# Patient Record
Sex: FEMALE | Race: WHITE | Employment: OTHER | URBAN - METROPOLITAN AREA
[De-identification: names, ages, dates, MRNs, and addresses within clinical notes are randomized per-mention and may not be internally consistent; named-entity substitution may affect disease eponyms.]

---

## 2022-04-04 ENCOUNTER — TRANSFERRED RECORDS (OUTPATIENT)
Dept: HEALTH INFORMATION MANAGEMENT | Facility: CLINIC | Age: 67
End: 2022-04-04
Payer: COMMERCIAL

## 2022-04-07 ENCOUNTER — TRANSFERRED RECORDS (OUTPATIENT)
Dept: HEALTH INFORMATION MANAGEMENT | Facility: CLINIC | Age: 67
End: 2022-04-07
Payer: COMMERCIAL

## 2022-04-07 ENCOUNTER — MEDICAL CORRESPONDENCE (OUTPATIENT)
Dept: HEALTH INFORMATION MANAGEMENT | Facility: CLINIC | Age: 67
End: 2022-04-07
Payer: COMMERCIAL

## 2022-04-11 ENCOUNTER — TRANSFERRED RECORDS (OUTPATIENT)
Dept: HEALTH INFORMATION MANAGEMENT | Facility: CLINIC | Age: 67
End: 2022-04-11
Payer: COMMERCIAL

## 2022-04-20 ENCOUNTER — TRANSCRIBE ORDERS (OUTPATIENT)
Dept: OTHER | Age: 67
End: 2022-04-20
Payer: COMMERCIAL

## 2022-04-20 DIAGNOSIS — R22.41 ANKLE MASS, RIGHT: Primary | ICD-10-CM

## 2022-04-22 NOTE — TELEPHONE ENCOUNTER
Action    Action Taken 4/22/2022 10:37AM KEB   [facility] Phone: [phone] #0- unavailable.     5/2/2022 8:42AM KEB   I called the [facility] Ph: [phone] #0- I left a detailed vm for medical records.      I called [facility] in [place] Ph: [phone] -     5/4/2022 3:56pm KEB   I called pt Maranda - unavailable.     I called [facility] in [place] - they don't have an xray on the pt.      DIAGNOSIS: R Ankle Mass   APPOINTMENT DATE: 5/5/2022   NOTES STATUS DETAILS   OFFICE NOTE from referring provider Received ref. by Dr. Epi Holland MD at the [facility] in [place], [place]   OFFICE NOTE from other specialist Received [facility] Records are in EPIC   LABS     MRI Received [facility] @ [facility] in [place]   TUMOR     PATHOLOGY  Slides & report

## 2022-04-28 ASSESSMENT — ENCOUNTER SYMPTOMS
ARTHRALGIAS: 1
MUSCLE CRAMPS: 0
BACK PAIN: 0
JOINT SWELLING: 1
MYALGIAS: 0
STIFFNESS: 0
MUSCLE WEAKNESS: 0
NECK PAIN: 0

## 2022-05-02 ENCOUNTER — TELEPHONE (OUTPATIENT)
Dept: ORTHOPEDICS | Facility: CLINIC | Age: 67
End: 2022-05-02
Payer: COMMERCIAL

## 2022-05-02 NOTE — TELEPHONE ENCOUNTER
ATC called and spoke to pt. Pt wanted to know if Dr El sees sarcoma patients. Pt had other questions regarding how quickly she can get surgery/treatment, ATC stated it's depending on the severity of the case. ATC answered all questions. Pt appreciated call and ATC for answering her questions.     Sarah Downing ATC

## 2022-05-02 NOTE — TELEPHONE ENCOUNTER
[facility] Call Center    Phone Message    May a detailed message be left on voicemail: yes     Reason for Call: Other: Patient would like a call back to discuss the MRI and additional information before driving [place] out of [place].     Action Taken: Message routed to:  [facility] Orthopedics    Travel Screening: Not Applicable

## 2022-05-04 DIAGNOSIS — R22.40 ANKLE MASS: Primary | ICD-10-CM

## 2022-05-05 ENCOUNTER — PRE VISIT (OUTPATIENT)
Dept: ORTHOPEDICS | Facility: CLINIC | Age: 67
End: 2022-05-05

## 2022-05-05 ENCOUNTER — OFFICE VISIT (OUTPATIENT)
Dept: ORTHOPEDICS | Facility: CLINIC | Age: 67
End: 2022-05-05
Payer: COMMERCIAL

## 2022-05-05 ENCOUNTER — ANCILLARY PROCEDURE (OUTPATIENT)
Dept: GENERAL RADIOLOGY | Facility: CLINIC | Age: 67
End: 2022-05-05
Attending: ORTHOPAEDIC SURGERY
Payer: COMMERCIAL

## 2022-05-05 VITALS — BODY MASS INDEX: 30.82 KG/M2 | HEIGHT: 65 IN | WEIGHT: 185 LBS

## 2022-05-05 DIAGNOSIS — R22.41 ANKLE MASS, RIGHT: ICD-10-CM

## 2022-05-05 DIAGNOSIS — R22.40 ANKLE MASS: ICD-10-CM

## 2022-05-05 PROCEDURE — 73610 X-RAY EXAM OF ANKLE: CPT | Mod: RT | Performed by: RADIOLOGY

## 2022-05-05 PROCEDURE — 99204 OFFICE O/P NEW MOD 45 MIN: CPT | Performed by: ORTHOPAEDIC SURGERY

## 2022-05-05 RX ORDER — UBIDECARENONE 75 MG
500 CAPSULE ORAL
COMMUNITY

## 2022-05-05 RX ORDER — TRAZODONE HYDROCHLORIDE 50 MG/1
TABLET, FILM COATED ORAL
COMMUNITY
Start: 2020-04-28

## 2022-05-05 RX ORDER — FAMOTIDINE 20 MG/1
TABLET, FILM COATED ORAL
COMMUNITY
End: 2023-03-16

## 2022-05-05 NOTE — PROGRESS NOTES
"    [facility], Orthopaedic Oncology Surgery Consultation  by Gregory El M.D.    Maranda Arnett MRN# [id number]    YOB: 1955     Requesting physician: Epi Glover   No primary care provider on file.            Assessment and Plan:   Assessment:  Soft tissue mass 3 x 5.5 cm, superficial alongside dorsum of right tibiotalar joint.  Differential diagnosis would include benign eg PVNS, vs malignant soft tissue neoplasms.     Plan:  Proceed with core needle biopsy.  Unfortunate this could not be completed today and therefore will be planned for next clinic visit on Monday, May 9.           History of Present Illness:   67 year old female  chief complaint    This patient is referred by Dr. Holland in [place] for evaluation of a mass of her right ankle which has been present for approximately 3 to 4 years.  History is notable for a uterine malignancy 10 years ago.    Current symptoms:  Problem: Right Ankle Mass   Onset and duration: 4 years ago  Awakens from sleep due to sx's:  No  Precipitating Injury:  No    Other joints or sites painful:  No  Fever: No  Appetite change or weight loss: No  History of prior or existing cancer: Yes early stage uterine cancer     Background history:  DX:  1. Soft tissue mass right ankle    TREATMENTS:  1. Removal of uterine cancerous tumor at [facility] approx 10 years ago             Physical Exam:     EXAMINATION pertinent findings:   PSYCH: Pleasant, healthy-appearing, alert, oriented x3, cooperative. Normal mood and affect.  VITAL SIGNS: Height 1.651 m (5' 5\"), weight 83.9 kg (185 lb)..  Reviewed nursing intake notes.   Body mass index is 30.79 kg/m .  RESP: non labored breathing   ABD: benign, soft, non-tender, no acute peritoneal findings  SKIN: grossly normal   LYMPHATIC: grossly normal, no adenopathy, no extremity edema  NEURO: grossly normal , no motor deficits  VASCULAR: satisfactory perfusion of all extremities   MUSCULOSKELETAL:   Gait is " normal.  Palpable soft tissue mass of the dorsum of the right ankle.  Somewhat mobile.  3 cm in size.  Minimally tender.  Normal tibiotalar and subtalar and midfoot motion.             Data:   All laboratory data reviewed  All imaging studies reviewed by me          DATA for DOCUMENTATION:         Past Medical History:   There is no problem list on file for this patient.    No past medical history on file.    Also see scanned health assessment forms.       Past Surgical History:   No past surgical history on file.         Social History:     Social History     Socioeconomic History     Marital status:      Spouse name: Not on file     Number of children: Not on file     Years of education: Not on file     Highest education level: Not on file   Occupational History     Not on file   Tobacco Use     Smoking status: Not on file     Smokeless tobacco: Not on file   Substance and Sexual Activity     Alcohol use: Not on file     Drug use: Not on file     Sexual activity: Not on file   Other Topics Concern     Not on file   Social History Narrative     Not on file     Social Determinants of Health     Financial Resource Strain: Not on file   Food Insecurity: Not on file   Transportation Needs: Not on file   Physical Activity: Not on file   Stress: Not on file   Social Connections: Not on file   Intimate Partner Violence: Not on file   Housing Stability: Not on file            Family History:     No family history on file.         Medications:     Current Outpatient Medications   Medication Sig     Cholecalciferol (VITAMIN D3) 1.25 MG (83793 UT) TABS Vitamin D3     cyanocobalamin (VITAMIN B-12) 100 MCG tablet Vitamin B12   daily     famotidine (PEPCID) 20 MG tablet famotidine 20 mg tablet     traZODone (DESYREL) 50 MG tablet trazodone 50 mg tablet   TAKE 1 TABLET BY MOUTH EVERY DAY     No current facility-administered medications for this visit.              Review of Systems:   A comprehensive 10 point review of  systems (constitutional, ENT, cardiac, peripheral vascular, lymphatic, respiratory, GI, , Musculoskeletal, skin, Neurological) was performed and found to be negative except as described in this note.     See intake form completed by patient    Answers for HPI/ROS submitted by the patient on 4/28/2022  General Symptoms: No  Skin Symptoms: No  HENT Symptoms: No  EYE SYMPTOMS: No  HEART SYMPTOMS: No  LUNG SYMPTOMS: No  INTESTINAL SYMPTOMS: No  URINARY SYMPTOMS: No  GYNECOLOGIC SYMPTOMS: No  BREAST SYMPTOMS: No  SKELETAL SYMPTOMS: Yes  BLOOD SYMPTOMS: No  NERVOUS SYSTEM SYMPTOMS: No  MENTAL HEALTH SYMPTOMS: No  Back pain: No  Muscle aches: No  Neck pain: No  Swollen joints: Yes  Joint pain: Yes  Bone pain: Yes  Muscle cramps: No  Muscle weakness: No  Joint stiffness: No  Bone fracture: No

## 2022-05-05 NOTE — LETTER
Date:May 6, 2022      Provider requested that no letter be sent. Do not send.       [facility]

## 2022-05-09 ENCOUNTER — OFFICE VISIT (OUTPATIENT)
Dept: ORTHOPEDICS | Facility: CLINIC | Age: 67
End: 2022-05-09
Payer: COMMERCIAL

## 2022-05-09 VITALS — HEIGHT: 65 IN | WEIGHT: 185 LBS | BODY MASS INDEX: 30.82 KG/M2

## 2022-05-09 DIAGNOSIS — R22.41 ANKLE MASS, RIGHT: Primary | ICD-10-CM

## 2022-05-09 PROCEDURE — 88305 TISSUE EXAM BY PATHOLOGIST: CPT | Mod: 26 | Performed by: PATHOLOGY

## 2022-05-09 PROCEDURE — 99213 OFFICE O/P EST LOW 20 MIN: CPT | Mod: 25 | Performed by: ORTHOPAEDIC SURGERY

## 2022-05-09 PROCEDURE — 88305 TISSUE EXAM BY PATHOLOGIST: CPT | Mod: TC | Performed by: ORTHOPAEDIC SURGERY

## 2022-05-09 PROCEDURE — 20206 BIOPSY MUSCLE PERQ NEEDLE: CPT | Mod: RT | Performed by: ORTHOPAEDIC SURGERY

## 2022-05-09 PROCEDURE — 88333 PATH CONSLTJ SURG CYTO XM 1: CPT | Mod: TC | Performed by: ORTHOPAEDIC SURGERY

## 2022-05-09 RX ORDER — MELOXICAM 7.5 MG/1
TABLET ORAL
Status: ON HOLD | COMMUNITY
End: 2022-06-03

## 2022-05-09 RX ORDER — ZOLPIDEM TARTRATE 5 MG/1
TABLET ORAL
Status: ON HOLD | COMMUNITY
End: 2022-06-03

## 2022-05-09 RX ORDER — IBUPROFEN 800 MG/1
TABLET, FILM COATED ORAL
COMMUNITY
End: 2023-03-16

## 2022-05-09 RX ORDER — AZITHROMYCIN 250 MG/1
TABLET, FILM COATED ORAL
Status: ON HOLD | COMMUNITY
End: 2022-06-03

## 2022-05-09 RX ORDER — ESZOPICLONE 2 MG/1
TABLET, FILM COATED ORAL
Status: ON HOLD | COMMUNITY
End: 2022-06-03

## 2022-05-09 RX ORDER — METOCLOPRAMIDE 10 MG/1
TABLET ORAL
Status: ON HOLD | COMMUNITY
End: 2022-06-03

## 2022-05-09 NOTE — NURSING NOTE
[facility] [address] [address] [phone]  Dept: [phone]  ______________________________________________________________________________    Patient: Maranda Arnett   : 1955   MRN: [id number]   May 9, 2022    INVASIVE PROCEDURE SAFETY CHECKLIST    Date: 2022   Procedure:Right ankle Needle core biopsy  Patient Name: Maranda Arnett  MRN: [id number]  YOB: 1955    Action: Complete sections as appropriate. Any discrepancy results in a HARD COPY until resolved.     PRE PROCEDURE:  Patient ID verified with 2 identifiers (name and  or MRN): Yes  Procedure and site verified with patient/designee (when able): Yes  Accurate consent documentation in medical record: Yes  H&P (or appropriate assessment) documented in medical record: NA  H&P must be up to 20 days prior to procedure and updates within 24 hours of procedure as applicable: NA  Relevant diagnostic and radiology test results appropriately labeled and displayed as applicable: NA  Procedure site(s) marked with provider initials: NA    TIMEOUT:  Time-Out performed immediately prior to starting procedure, including verbal and active participation of all team members addressing the following:Yes  * Correct patient identify  * Confirmed that the correct side and site are marked  * An accurate procedure consent form  * Agreement on the procedure to be done  * Correct patient position  * Relevant images and results are properly labeled and appropriately displayed  * The need to administer antibiotics or fluids for irrigation purposes during the procedure as applicable   * Safety precautions based on patient history or medication use    DURING PROCEDURE: Verification of correct person, site, and procedures any time the responsibility for care of the patient is transferred to another member of the care team.       The following medications were given:          Prior to injection, verified patient identity using patient's name and date of birth.  Due to injection administration, patient instructed to remain in clinic for 15 minutes  afterwards, and to report any adverse reaction to me immediately.      Medication Name: Lidocaine NDC 71428-493-61  Drug Amount Wasted:  Yes: 8 mg/ml   Vial/Syringe: Multi dose vial  Expiration Date:  1/1/2024      Scribed by Mamadou Nieves, ELOISE for Dr. El on May 9, 2022 at 0730 based on the provider's statements to me.     Mamadou Nieves, EMT

## 2022-05-09 NOTE — LETTER
5/9/2022     RE: Maranda Arnett  [address] [address]    Dear Colleague,    Thank you for referring your patient, Maranda Arnett, to the [facility] [place]. Please see a copy of my visit note below.        [facility], Orthopaedic Oncology Surgery Consultation  by Gregory El M.D.    Maranda Arnett MRN# [id number]    YOB: 1955     Requesting physician: Epi Glover   No primary care provider on file.     DX:  1. Soft tissue mass 3 x 5.5 cm, superficial alongside dorsum of right tibiotalar joint.      TREATMENTS:  1. Removal of uterine cancerous tumor at [facility] approx 10 years ago  2. 5/9/2022, Core needle biopsy R ankle soft tissue mass (Nikko) [facility]      Maranda returns for biopsy procedure of her right ankle.    PROCEDURE NOTE:  Under sterile precautions with 1% Xylocaine anesthesia, the right ankle mass was sampled with a core biopsy instrument.  #11 blade was used to make an incision and 5 cores of tissue were obtained.  There are no complications.  Steri-Strips and guaze dressing were applied afterwards.      Plan:  Virtual follow-up visit to review biopsy results in 1 week's time.    We did have a brief discussion regarding the range of diagnostic options and potential treatments which most likely will involve en bloc excision if this proves to be a benign lesion or if a malignancy, then feasibility of limb salvage versus amputation will need to be considered.    MD Earline Tyler Family Professor  Oncology and Adult Reconstructive Surgery  Dept Orthopaedic Surgery, [facility]  [phone] office, [phone] pager  [email or web address]

## 2022-05-09 NOTE — PROGRESS NOTES
[facility], Orthopaedic Oncology Surgery Consultation  by Gregory El M.D.    Maranda Arnett MRN# [id number]    YOB: 1955     Requesting physician: Epi Glover   No primary care provider on file.     DX:  1. Soft tissue mass 3 x 5.5 cm, superficial alongside dorsum of right tibiotalar joint.      TREATMENTS:  1. Removal of uterine cancerous tumor at [facility] approx 10 years ago  2. 5/9/2022, Core needle biopsy R ankle soft tissue mass (Nikko) [facility]      Maranda returns for biopsy procedure of her right ankle.    PROCEDURE NOTE:  Under sterile precautions with 1% Xylocaine anesthesia, the right ankle mass was sampled with a core biopsy instrument.  #11 blade was used to make an incision and 5 cores of tissue were obtained.  There are no complications.  Steri-Strips and guaze dressing were applied afterwards.      Plan:  Virtual follow-up visit to review biopsy results in 1 week's time.    We did have a brief discussion regarding the range of diagnostic options and potential treatments which most likely will involve en bloc excision if this proves to be a benign lesion or if a malignancy, then feasibility of limb salvage versus amputation will need to be considered.    MD Earline Tyler Family Professor  Oncology and Adult Reconstructive Surgery  Dept Orthopaedic Surgery, [facility]  [phone] office, [phone] pager  [email or web address]      Total combined visit time and work time before and after clinic visit = 20 min

## 2022-05-10 LAB
PATH REPORT.COMMENTS IMP SPEC: NORMAL
PATH REPORT.COMMENTS IMP SPEC: NORMAL
PATH REPORT.FINAL DX SPEC: NORMAL
PATH REPORT.GROSS SPEC: NORMAL
PATH REPORT.MICROSCOPIC SPEC OTHER STN: NORMAL
PATH REPORT.RELEVANT HX SPEC: NORMAL
PHOTO IMAGE: NORMAL

## 2022-05-16 ENCOUNTER — VIRTUAL VISIT (OUTPATIENT)
Dept: ORTHOPEDICS | Facility: CLINIC | Age: 67
End: 2022-05-16
Payer: COMMERCIAL

## 2022-05-16 DIAGNOSIS — R22.41 ANKLE MASS, RIGHT: Primary | ICD-10-CM

## 2022-05-16 PROCEDURE — 99214 OFFICE O/P EST MOD 30 MIN: CPT | Mod: 95 | Performed by: ORTHOPAEDIC SURGERY

## 2022-05-16 NOTE — PROGRESS NOTES
[facility], Orthopaedic Oncology Surgery Consultation  by Gregory El M.D.    Maranda Arnett MRN# [id number]    YOB: 1955     Requesting physician: Epi Glover   No primary care provider on file.     DX:  1. 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      TREATMENTS:  1. Removal of uterine cancerous tumor at [facility] approx 10 years ago  2. 5/9/2022, Core needle biopsy R ankle soft tissue mass (Nikko) [facility]    Reviewed pathology report with pt revealing the diagnosis above.  I summarized the diagnosis, its benign nature and the natural history, the various forms of nodular or diffuse disease and the potential for recurrence.    I discussed the risks, benefits, alternatives regarding the proposed surgery with the patient who both demonstrated understanding and indicated a desire to proceed with the surgery as planned.  I also discussed to potential for tumor relapse.        EXAM deferred due to video visit    IMP/PLAN:  1. Pt would like to proceed with surgical excision.  2. Proposed surgery: Excision of R ankle T-GCT/PVNS, 1 hour, outpt same day surgery, tier 3.  3. I will ask our nurse HANNAH De La Rosa to contact pt to schedule and arrange.    Virtual-Visit Details    Type of service:  Video/telephone Visit  Video/telephone total duration (including visit time, pre and post visit work time as documented above on the same day of service): 20    Visit start time: 1600  Visit end time: 1630  Originating Location (pt. Location): Home  Distant Location (provider location):  [facility]   Platform used for Virtual Visit: MD Earline Frost Family Professor  Oncology and Adult Reconstructive Surgery  Dept Orthopaedic Surgery, [facility]  [phone] office, [phone] pager  [email or web address]

## 2022-05-16 NOTE — LETTER
5/16/2022         RE: Maranda Arnett  [address]   [place] [address]        Dear Colleague,    Thank you for referring your patient, Maranda Arnett, to the [facility]. Please see a copy of my visit note below.        [facility], Orthopaedic Oncology Surgery Consultation  by Gregory El M.D.    Maranda Arnett MRN# [id number]    YOB: 1955     Requesting physician: Epi Glover   No primary care provider on file.     DX:  1. 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      TREATMENTS:  1. Removal of uterine cancerous tumor at [facility] approx 10 years ago  2. 5/9/2022, Core needle biopsy R ankle soft tissue mass (Nikko) [facility]    Reviewed pathology report with pt revealing the diagnosis above.  I summarized the diagnosis, its benign nature and the natural history, the various forms of nodular or diffuse disease and the potential for recurrence.    I discussed the risks, benefits, alternatives regarding the proposed surgery with the patient who both demonstrated understanding and indicated a desire to proceed with the surgery as planned.  I also discussed to potential for tumor relapse.        EXAM deferred due to video visit    IMP/PLAN:  1. Pt would like to proceed with surgical excision.  2. Proposed surgery: Excision of R ankle T-GCT/PVNS, 1 hour, outpt same day surgery, tier 3.  3. I will ask our nurse HANNAH De La Rosa to contact pt to schedule and arrange.    Virtual-Visit Details    Type of service:  Video/telephone Visit  Video/telephone total duration (including visit time, pre and post visit work time as documented above on the same day of service): 20    Visit start time: 1600  Visit end time: 1630  Originating Location (pt. Location): Home  Distant Location (provider location):  [facility]   Platform used for Virtual Visit:  MD Earline Frost Family Professor  Oncology and Adult Reconstructive Surgery  Dept Orthopaedic Surgery, [facility]  [phone] office, [phone] pager  [email or web address]                    Again, thank you for allowing me to participate in the care of your patient.        Sincerely,        Gregory El MD

## 2022-05-16 NOTE — NURSING NOTE
Chief Complaint   Patient presents with     RECHECK     Review Pathology        67 year old  1955    There were no vitals taken for this visit.      Date/Surgery/Surgeon/Hospital:  Past Surgical History:   Procedure Laterality Date     [place] HAND/FINGER SURGERY UNLISTED  ??     [place] SHOULDER SURG PROC UNLISTED  ??                Pain Assessment  Patient Currently in Pain: No        [facility] [place], [place] - [address] [facility]      No Known Allergies        Current Outpatient Medications   Medication     cyanocobalamin (VITAMIN B-12) 100 MCG tablet     famotidine (PEPCID) 20 MG tablet     meloxicam (MOBIC) 7.5 MG tablet     traZODone (DESYREL) 50 MG tablet     zolpidem (AMBIEN) 5 MG tablet     azithromycin (ZITHROMAX) 250 MG tablet     Cholecalciferol (VITAMIN D3) 1.25 MG (07791 UT) TABS     conjugated estrogens (PREMARIN) 0.625 MG/GM vaginal cream     eszopiclone (LUNESTA) 2 MG tablet     ibuprofen (ADVIL/MOTRIN) 800 MG tablet     metoclopramide (REGLAN) 10 MG tablet     No current facility-administered medications for this visit.             Questionnaires:    Promis 10 Assessment    PROMIS 10 5/5/2022   In general, would you say your health is: Fair   In general, would you say your quality of life is: Very good   In general, how would you rate your physical health? Fair   In general, how would you rate your mental health, including your mood and your ability to think? Very good   In general, how would you rate your satisfaction with your social activities and relationships? Very good   In general, please rate how well you carry out your usual social activities and roles Very good   To what extent are you able to carry out your everyday physical activities such as walking, climbing stairs, carrying groceries, or moving a chair? Completely   How often have you been bothered by emotional problems such as feeling anxious, depressed or irritable? Sometimes   How would you rate your fatigue on average?  Mild   How would you rate your pain on average?   0 = No Pain  to  10 = Worst Imaginable Pain 2   In general, would you say your health is: 2   In general, would you say your quality of life is: 4   In general, how would you rate your physical health? 2   In general, how would you rate your mental health, including your mood and your ability to think? 4   In general, how would you rate your satisfaction with your social activities and relationships? 4   In general, please rate how well you carry out your usual social activities and roles. (This includes activities at home, at work and in your community, and responsibilities as a parent, child, spouse, employee, friend, etc.) 4   To what extent are you able to carry out your everyday physical activities such as walking, climbing stairs, carrying groceries, or moving a chair? 5   In the past 7 days, how often have you been bothered by emotional problems such as feeling anxious, depressed, or irritable? 3   In the past 7 days, how would you rate your fatigue on average? 2   In the past 7 days, how would you rate your pain on average, where 0 means no pain, and 10 means worst imaginable pain? 2   Global Mental Health Score 15   Global Physical Health Score 15   PROMIS TOTAL - SUBSCORES 30

## 2022-05-17 ENCOUNTER — PREP FOR PROCEDURE (OUTPATIENT)
Dept: ORTHOPEDICS | Facility: CLINIC | Age: 67
End: 2022-05-17
Payer: COMMERCIAL

## 2022-05-17 ENCOUNTER — TELEPHONE (OUTPATIENT)
Dept: ORTHOPEDICS | Facility: CLINIC | Age: 67
End: 2022-05-17
Payer: COMMERCIAL

## 2022-05-17 DIAGNOSIS — R22.41 ANKLE MASS, RIGHT: Primary | ICD-10-CM

## 2022-05-17 NOTE — CONFIDENTIAL NOTE
-The patient was called & pre-op teaching was performed over the phone.     Teaching Flowsheet   Relevant Diagnosis: Pre-Op Teaching   Teaching Topic: Excision of R ankle T-GCT/PVNS, 1 hour, outpt same day surgery, tier 3.       Person(s) involved in teaching:   Patient     Motivation Level:  Asks Questions: Yes  Eager to Learn: Yes  Cooperative: Yes  Receptive (willing/able to accept information): Yes  Any cultural factors/Jew beliefs that may influence understanding or compliance? No  Comments:      Patient demonstrates understanding of the following:  Reason for the appointment, diagnosis and treatment plan: Yes  Knowledge of proper use of medications and conditions for which they are ordered (with special attention to potential side effects or drug interactions): Yes  Which situations necessitate calling provider and whom to contact: Yes  What has the patient tried?    Has your symptoms improved?       Teaching Concerns Addressed:   Comments:      Proper use and care of surgical scrub, which the patient was informed to buy, over the counter, at her local pharmacy.  (medical equip, care aids, etc.): Yes  Nutritional needs and diet plan: Yes  Pain management techniques: Yes  Wound Care: Yes  How and/when to access community resources: Yes     Instructional Materials Used/Given: patient to purchase surgical scrub, OTC & Charlee to mail her out a surgical packet.     -In addition to the information above, the stoplight tool, medications to avoid prior to surgery, showering instructions, & who will be driving her home/ staying with her for 24 hours after surgery (her , Choco) were also discussed.     -Charlee to call the patient to schedule a surgery date, pre-op & a Covid test.     Soco Acosta RN  5/17/2022 1:54 PM     Time spent with patient: 15 minutes.

## 2022-05-18 ENCOUNTER — TELEPHONE (OUTPATIENT)
Dept: ORTHOPEDICS | Facility: CLINIC | Age: 67
End: 2022-05-18
Payer: COMMERCIAL

## 2022-05-18 NOTE — TELEPHONE ENCOUNTER
Received call back from patient to schedule surgery. Patient has been scheduled for 6/3/22. Patient will work on scheduling her pre-op H&P and COVID test and will call back with an update on when/if she can get those appointments before June 3rd.

## 2022-05-18 NOTE — TELEPHONE ENCOUNTER
Phoned patient to schedule surgery with Dr El. I left her my direct number to call back when she is able. [phone]

## 2022-05-19 ENCOUNTER — TRANSFERRED RECORDS (OUTPATIENT)
Dept: HEALTH INFORMATION MANAGEMENT | Facility: CLINIC | Age: 67
End: 2022-05-19
Payer: COMMERCIAL

## 2022-05-23 NOTE — PROGRESS NOTES
SURGERY PLAN (PRE-OP PLAN)    DX:  1. 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       TREATMENTS:  1. Removal of uterine cancerous tumor at [facility] approx 10 years ago  2. 5/9/2022, Core needle biopsy R ankle soft tissue mass (El) [facility]     Reviewed pathology report with pt revealing the diagnosis above.  I summarized the diagnosis, its benign nature and the natural history, the various forms of nodular or diffuse disease and the potential for recurrence.     I discussed the risks, benefits, alternatives regarding the proposed surgery with the patient who both demonstrated understanding and indicated a desire to proceed with the surgery as planned.  I also discussed to potential for tumor relapse.       IMP/PLAN:  1. Pt would like to proceed with surgical excision.  2. Proposed surgery: Excision of R ankle T-GCT/PVNS, 1 hour, outpt same day surgery, tier 3.  3. I will ask our nurse HANNAH De La Rosa to contact pt to schedule and arrange.      Patient Position (indicated by x):  x  Supine     Supine with torso rolled up on a bump     Floppy lateral on torso length bean bag     Lateral decubitus, bean bag, full length     Lateral decubitus, Wixson hip positioner     Safety paddle side supports x 2 clamped to side rail     Lithotomy, both legs in yellow padded leg white     Lithotomy, single leg in yellow padded leg white     Prone on blanket rolls/round gel pad     Prone on Omar (arched) frame on Josh table     Single thigh in orange arthroscopy clamp     Beach chair semi recumbent     Spider limb positioner     Arm out on radiolucent arm table     Split drape with top bar     Revision ANGELITA drape with plastic side bags for leg   x  Extremity drape     Shoulder pack drape     Laparotomy drape     Ballesteros catheter          General Equipment Requests (indicated by x):      C-Arm with C-Armor drape     C-Arm (video capable, ECI Telecom 9900 model)     O-Arm  with Stealth imaging   x  Regular OR Table     Josh XR Table     SurgiGraphic 6000 (diving board) fluoro table     Cell saver     Nikko Biopsy trephine set w/ K-wire & pituitary rongeurs     Small pituitary rongeur     Nikko's angled curettes, narrow shaft     Bone graft, kapner gouges     Midas Michael Medtronic eva, electric motor     Phenol 5%     [place] BMAC stem cell     Vancomycin 1 gram powder     Zometa 4 mg vials     Depo Medrol steroid     Blunt Pelvic Retractor (.55, Blunt Hohmann with  slight bend)     (1) Portable hand held radiation detector machine for sentinel node biopsy and (2) Lymphazurin   x  4 in plaster, webrill, 6 inch ACE wrap       Specimens and cultures (indicated by x):     Tissue cultures, aerobic and anaerobic without gram stain     Frozen section     pathology specimens - fresh   x  pathology specimens - formalin     Marcio Rdz DO  Adult Joint Reconstruction Fellow  Dept Orthopaedic Surgery, [facility]

## 2022-05-24 ENCOUNTER — TELEPHONE (OUTPATIENT)
Dept: ORTHOPEDICS | Facility: CLINIC | Age: 67
End: 2022-05-24
Payer: COMMERCIAL

## 2022-05-24 NOTE — TELEPHONE ENCOUNTER
-Charlee to mail out the patient a surgery packet today.    -Patient notified.    Soco Acosta RN  5/24/2022 3:28 PM

## 2022-05-24 NOTE — CONFIDENTIAL NOTE
-Talked to this patient over the phone and she noted that she had her pre-op H & P done on 5/19/22 at [facility] in [place], [place] and she has a Covid test scheduled for 5/31/22.    -Patient reports that she is still waiting for her surgery packet to arrive.    -In-basket message was sent to Charlee, to verify that she sent one out.    -Will await a response.    Soco Acosta RN  5/24/2022 3:15 PM

## 2022-05-26 DIAGNOSIS — Z11.59 ENCOUNTER FOR SCREENING FOR OTHER VIRAL DISEASES: Primary | ICD-10-CM

## 2022-05-29 ENCOUNTER — HEALTH MAINTENANCE LETTER (OUTPATIENT)
Age: 67
End: 2022-05-29

## 2022-06-03 ENCOUNTER — HOSPITAL ENCOUNTER (OUTPATIENT)
Facility: CLINIC | Age: 67
Discharge: HOME | End: 2022-06-03
Attending: ORTHOPAEDIC SURGERY | Admitting: ORTHOPAEDIC SURGERY
Payer: MEDICARE

## 2022-06-03 ENCOUNTER — ANESTHESIA EVENT (OUTPATIENT)
Dept: SURGERY | Facility: CLINIC | Age: 67
End: 2022-06-03
Payer: MEDICARE

## 2022-06-03 ENCOUNTER — TELEPHONE (OUTPATIENT)
Dept: ORTHOPEDICS | Facility: CLINIC | Age: 67
End: 2022-06-03

## 2022-06-03 ENCOUNTER — ANESTHESIA (OUTPATIENT)
Dept: SURGERY | Facility: CLINIC | Age: 67
End: 2022-06-03
Payer: MEDICARE

## 2022-06-03 VITALS
BODY MASS INDEX: 30.34 KG/M2 | OXYGEN SATURATION: 95 % | HEART RATE: 50 BPM | DIASTOLIC BLOOD PRESSURE: 73 MMHG | HEIGHT: 65 IN | SYSTOLIC BLOOD PRESSURE: 122 MMHG | WEIGHT: 182.1 LBS | RESPIRATION RATE: 20 BRPM | TEMPERATURE: 98.5 F

## 2022-06-03 DIAGNOSIS — G89.18 ACUTE POSTOPERATIVE PAIN: Primary | ICD-10-CM

## 2022-06-03 LAB — GLUCOSE BLDC GLUCOMTR-MCNC: 131 MG/DL (ref 70–99)

## 2022-06-03 PROCEDURE — 710N000012 HC RECOVERY PHASE 2, PER MINUTE: Performed by: ORTHOPAEDIC SURGERY

## 2022-06-03 PROCEDURE — 82962 GLUCOSE BLOOD TEST: CPT

## 2022-06-03 PROCEDURE — 250N000009 HC RX 250: Performed by: ORTHOPAEDIC SURGERY

## 2022-06-03 PROCEDURE — 710N000010 HC RECOVERY PHASE 1, LEVEL 2, PER MIN: Performed by: ORTHOPAEDIC SURGERY

## 2022-06-03 PROCEDURE — 250N000013 HC RX MED GY IP 250 OP 250 PS 637: Performed by: STUDENT IN AN ORGANIZED HEALTH CARE EDUCATION/TRAINING PROGRAM

## 2022-06-03 PROCEDURE — 272N000001 HC OR GENERAL SUPPLY STERILE: Performed by: ORTHOPAEDIC SURGERY

## 2022-06-03 PROCEDURE — 258N000003 HC RX IP 258 OP 636: Performed by: NURSE ANESTHETIST, CERTIFIED REGISTERED

## 2022-06-03 PROCEDURE — 258N000003 HC RX IP 258 OP 636: Performed by: STUDENT IN AN ORGANIZED HEALTH CARE EDUCATION/TRAINING PROGRAM

## 2022-06-03 PROCEDURE — 250N000011 HC RX IP 250 OP 636: Performed by: NURSE ANESTHETIST, CERTIFIED REGISTERED

## 2022-06-03 PROCEDURE — 250N000011 HC RX IP 250 OP 636: Performed by: PHYSICIAN ASSISTANT

## 2022-06-03 PROCEDURE — 88307 TISSUE EXAM BY PATHOLOGIST: CPT | Mod: TC | Performed by: ORTHOPAEDIC SURGERY

## 2022-06-03 PROCEDURE — 360N000077 HC SURGERY LEVEL 4, PER MIN: Performed by: ORTHOPAEDIC SURGERY

## 2022-06-03 PROCEDURE — 250N000025 HC SEVOFLURANE, PER MIN: Performed by: ORTHOPAEDIC SURGERY

## 2022-06-03 PROCEDURE — 27626 REMOVE ANKLE JOINT LINING: CPT | Mod: RT | Performed by: ORTHOPAEDIC SURGERY

## 2022-06-03 PROCEDURE — 999N000141 HC STATISTIC PRE-PROCEDURE NURSING ASSESSMENT: Performed by: ORTHOPAEDIC SURGERY

## 2022-06-03 PROCEDURE — 250N000009 HC RX 250: Performed by: NURSE ANESTHETIST, CERTIFIED REGISTERED

## 2022-06-03 PROCEDURE — 250N000011 HC RX IP 250 OP 636: Performed by: STUDENT IN AN ORGANIZED HEALTH CARE EDUCATION/TRAINING PROGRAM

## 2022-06-03 PROCEDURE — 370N000017 HC ANESTHESIA TECHNICAL FEE, PER MIN: Performed by: ORTHOPAEDIC SURGERY

## 2022-06-03 RX ORDER — NALOXONE HYDROCHLORIDE 0.4 MG/ML
0.4 INJECTION, SOLUTION INTRAMUSCULAR; INTRAVENOUS; SUBCUTANEOUS
Status: DISCONTINUED | OUTPATIENT
Start: 2022-06-03 | End: 2022-06-03 | Stop reason: HOSPADM

## 2022-06-03 RX ORDER — ONDANSETRON 2 MG/ML
4 INJECTION INTRAMUSCULAR; INTRAVENOUS EVERY 30 MIN PRN
Status: DISCONTINUED | OUTPATIENT
Start: 2022-06-03 | End: 2022-06-03 | Stop reason: HOSPADM

## 2022-06-03 RX ORDER — ACETAMINOPHEN 325 MG/1
650 TABLET ORAL
Status: DISCONTINUED | OUTPATIENT
Start: 2022-06-03 | End: 2022-06-03 | Stop reason: HOSPADM

## 2022-06-03 RX ORDER — CEFAZOLIN SODIUM/WATER 2 G/20 ML
2 SYRINGE (ML) INTRAVENOUS SEE ADMIN INSTRUCTIONS
Status: DISCONTINUED | OUTPATIENT
Start: 2022-06-03 | End: 2022-06-03 | Stop reason: HOSPADM

## 2022-06-03 RX ORDER — ONDANSETRON 4 MG/1
4 TABLET, ORALLY DISINTEGRATING ORAL EVERY 30 MIN PRN
Status: DISCONTINUED | OUTPATIENT
Start: 2022-06-03 | End: 2022-06-03 | Stop reason: HOSPADM

## 2022-06-03 RX ORDER — FENTANYL CITRATE 50 UG/ML
INJECTION, SOLUTION INTRAMUSCULAR; INTRAVENOUS PRN
Status: DISCONTINUED | OUTPATIENT
Start: 2022-06-03 | End: 2022-06-03

## 2022-06-03 RX ORDER — ONDANSETRON 2 MG/ML
INJECTION INTRAMUSCULAR; INTRAVENOUS PRN
Status: DISCONTINUED | OUTPATIENT
Start: 2022-06-03 | End: 2022-06-03

## 2022-06-03 RX ORDER — OXYCODONE HYDROCHLORIDE 5 MG/1
5-10 TABLET ORAL EVERY 4 HOURS PRN
Qty: 10 TABLET | Refills: 0 | Status: SHIPPED | OUTPATIENT
Start: 2022-06-03 | End: 2023-03-16

## 2022-06-03 RX ORDER — OXYCODONE HYDROCHLORIDE 5 MG/1
5 TABLET ORAL EVERY 4 HOURS PRN
Status: DISCONTINUED | OUTPATIENT
Start: 2022-06-03 | End: 2022-06-03 | Stop reason: HOSPADM

## 2022-06-03 RX ORDER — HYDROMORPHONE HYDROCHLORIDE 1 MG/ML
0.2 INJECTION, SOLUTION INTRAMUSCULAR; INTRAVENOUS; SUBCUTANEOUS EVERY 5 MIN PRN
Status: DISCONTINUED | OUTPATIENT
Start: 2022-06-03 | End: 2022-06-03 | Stop reason: HOSPADM

## 2022-06-03 RX ORDER — MEPERIDINE HYDROCHLORIDE 25 MG/ML
12.5 INJECTION INTRAMUSCULAR; INTRAVENOUS; SUBCUTANEOUS
Status: DISCONTINUED | OUTPATIENT
Start: 2022-06-03 | End: 2022-06-03 | Stop reason: HOSPADM

## 2022-06-03 RX ORDER — CEFAZOLIN SODIUM/WATER 2 G/20 ML
2 SYRINGE (ML) INTRAVENOUS
Status: COMPLETED | OUTPATIENT
Start: 2022-06-03 | End: 2022-06-03

## 2022-06-03 RX ORDER — ONDANSETRON 4 MG/1
4 TABLET, ORALLY DISINTEGRATING ORAL EVERY 8 HOURS PRN
Qty: 4 TABLET | Refills: 0 | Status: SHIPPED | OUTPATIENT
Start: 2022-06-03 | End: 2023-03-16

## 2022-06-03 RX ORDER — FENTANYL CITRATE-0.9 % NACL/PF 10 MCG/ML
PLASTIC BAG, INJECTION (ML) INTRAVENOUS PRN
Status: DISCONTINUED | OUTPATIENT
Start: 2022-06-03 | End: 2022-06-03

## 2022-06-03 RX ORDER — FENTANYL CITRATE 50 UG/ML
25 INJECTION, SOLUTION INTRAMUSCULAR; INTRAVENOUS
Status: DISCONTINUED | OUTPATIENT
Start: 2022-06-03 | End: 2022-06-03 | Stop reason: HOSPADM

## 2022-06-03 RX ORDER — MULTIVITAMIN WITH IRON
1 TABLET ORAL DAILY
COMMUNITY
End: 2023-03-16

## 2022-06-03 RX ORDER — DEXAMETHASONE SODIUM PHOSPHATE 4 MG/ML
INJECTION, SOLUTION INTRA-ARTICULAR; INTRALESIONAL; INTRAMUSCULAR; INTRAVENOUS; SOFT TISSUE PRN
Status: DISCONTINUED | OUTPATIENT
Start: 2022-06-03 | End: 2022-06-03

## 2022-06-03 RX ORDER — NALOXONE HYDROCHLORIDE 0.4 MG/ML
0.2 INJECTION, SOLUTION INTRAMUSCULAR; INTRAVENOUS; SUBCUTANEOUS
Status: DISCONTINUED | OUTPATIENT
Start: 2022-06-03 | End: 2022-06-03 | Stop reason: HOSPADM

## 2022-06-03 RX ORDER — AMOXICILLIN 250 MG
1-2 CAPSULE ORAL 2 TIMES DAILY
Qty: 30 TABLET | Refills: 0 | Status: SHIPPED | OUTPATIENT
Start: 2022-06-03 | End: 2023-03-16

## 2022-06-03 RX ORDER — SODIUM CHLORIDE, SODIUM LACTATE, POTASSIUM CHLORIDE, CALCIUM CHLORIDE 600; 310; 30; 20 MG/100ML; MG/100ML; MG/100ML; MG/100ML
INJECTION, SOLUTION INTRAVENOUS CONTINUOUS
Status: DISCONTINUED | OUTPATIENT
Start: 2022-06-03 | End: 2022-06-03 | Stop reason: HOSPADM

## 2022-06-03 RX ORDER — KETOROLAC TROMETHAMINE 30 MG/ML
INJECTION, SOLUTION INTRAMUSCULAR; INTRAVENOUS PRN
Status: DISCONTINUED | OUTPATIENT
Start: 2022-06-03 | End: 2022-06-03

## 2022-06-03 RX ORDER — SODIUM CHLORIDE, SODIUM LACTATE, POTASSIUM CHLORIDE, CALCIUM CHLORIDE 600; 310; 30; 20 MG/100ML; MG/100ML; MG/100ML; MG/100ML
INJECTION, SOLUTION INTRAVENOUS CONTINUOUS PRN
Status: DISCONTINUED | OUTPATIENT
Start: 2022-06-03 | End: 2022-06-03

## 2022-06-03 RX ORDER — PROPOFOL 10 MG/ML
INJECTION, EMULSION INTRAVENOUS PRN
Status: DISCONTINUED | OUTPATIENT
Start: 2022-06-03 | End: 2022-06-03

## 2022-06-03 RX ORDER — LIDOCAINE HYDROCHLORIDE 20 MG/ML
INJECTION, SOLUTION INFILTRATION; PERINEURAL PRN
Status: DISCONTINUED | OUTPATIENT
Start: 2022-06-03 | End: 2022-06-03

## 2022-06-03 RX ORDER — FENTANYL CITRATE 50 UG/ML
25 INJECTION, SOLUTION INTRAMUSCULAR; INTRAVENOUS EVERY 5 MIN PRN
Status: DISCONTINUED | OUTPATIENT
Start: 2022-06-03 | End: 2022-06-03 | Stop reason: HOSPADM

## 2022-06-03 RX ORDER — OXYCODONE HYDROCHLORIDE 5 MG/1
5 TABLET ORAL
Status: DISCONTINUED | OUTPATIENT
Start: 2022-06-03 | End: 2022-06-03 | Stop reason: HOSPADM

## 2022-06-03 RX ORDER — BUPIVACAINE HYDROCHLORIDE AND EPINEPHRINE 2.5; 5 MG/ML; UG/ML
INJECTION, SOLUTION INFILTRATION; PERINEURAL PRN
Status: DISCONTINUED | OUTPATIENT
Start: 2022-06-03 | End: 2022-06-03 | Stop reason: HOSPADM

## 2022-06-03 RX ADMIN — Medication 100 MCG: at 10:24

## 2022-06-03 RX ADMIN — KETOROLAC TROMETHAMINE 30 MG: 30 INJECTION, SOLUTION INTRAMUSCULAR at 11:13

## 2022-06-03 RX ADMIN — MIDAZOLAM 2 MG: 1 INJECTION INTRAMUSCULAR; INTRAVENOUS at 10:02

## 2022-06-03 RX ADMIN — ONDANSETRON 4 MG: 2 INJECTION INTRAMUSCULAR; INTRAVENOUS at 11:13

## 2022-06-03 RX ADMIN — LIDOCAINE HYDROCHLORIDE 100 MG: 20 INJECTION, SOLUTION INFILTRATION; PERINEURAL at 10:11

## 2022-06-03 RX ADMIN — PROPOFOL 160 MG: 10 INJECTION, EMULSION INTRAVENOUS at 10:11

## 2022-06-03 RX ADMIN — OXYCODONE HYDROCHLORIDE 5 MG: 5 TABLET ORAL at 11:59

## 2022-06-03 RX ADMIN — FENTANYL CITRATE 100 MCG: 50 INJECTION, SOLUTION INTRAMUSCULAR; INTRAVENOUS at 10:11

## 2022-06-03 RX ADMIN — HYDROMORPHONE HYDROCHLORIDE 0.2 MG: 1 INJECTION, SOLUTION INTRAMUSCULAR; INTRAVENOUS; SUBCUTANEOUS at 12:00

## 2022-06-03 RX ADMIN — FENTANYL CITRATE 25 MCG: 50 INJECTION, SOLUTION INTRAMUSCULAR; INTRAVENOUS at 12:16

## 2022-06-03 RX ADMIN — Medication 2 G: at 10:02

## 2022-06-03 RX ADMIN — DEXAMETHASONE SODIUM PHOSPHATE 6 MG: 4 INJECTION, SOLUTION INTRAMUSCULAR; INTRAVENOUS at 10:22

## 2022-06-03 RX ADMIN — Medication 100 MCG: at 10:13

## 2022-06-03 RX ADMIN — SODIUM CHLORIDE, POTASSIUM CHLORIDE, SODIUM LACTATE AND CALCIUM CHLORIDE: 600; 310; 30; 20 INJECTION, SOLUTION INTRAVENOUS at 10:04

## 2022-06-03 RX ADMIN — SODIUM CHLORIDE, POTASSIUM CHLORIDE, SODIUM LACTATE AND CALCIUM CHLORIDE: 600; 310; 30; 20 INJECTION, SOLUTION INTRAVENOUS at 11:44

## 2022-06-03 RX ADMIN — Medication 100 MCG: at 10:39

## 2022-06-03 RX ADMIN — FENTANYL CITRATE 25 MCG: 50 INJECTION, SOLUTION INTRAMUSCULAR; INTRAVENOUS at 11:59

## 2022-06-03 NOTE — PROGRESS NOTES
updated over the phone in the waiting room on the POC of pt Scott Sturges, RN on 6/3/2022 at 12:29 PM

## 2022-06-03 NOTE — ANESTHESIA PREPROCEDURE EVALUATION
Anesthesia Pre-Procedure Evaluation    Patient: Maranda Arnett   MRN: [id number] : 1955        Procedure : Procedure(s):  Excision of Right ankle tenosynovial giant cell tumor / pigmented villonodular synovitis          Past Medical History:   Diagnosis Date     Cancer (H) ??      Past Surgical History:   Procedure Laterality Date     GA HAND/FINGER SURGERY UNLISTED  ??     GA SHOULDER SURG PROC UNLISTED  ??      No Known Allergies   Social History     Tobacco Use     Smoking status: Never Smoker     Smokeless tobacco: Never Used   Substance Use Topics     Alcohol use: Not Currently      Wt Readings from Last 1 Encounters:   22 82.6 kg (182 lb 1.6 oz)        Anesthesia Evaluation   Pt has had prior anesthetic.         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      GI/Hepatic:     (+) GERD, Asymptomatic on medication,     Renal/Genitourinary:  - neg Renal ROS     Endo:  - neg endo ROS     Psychiatric/Substance Use:  - neg psychiatric ROS     Infectious Disease:  - neg infectious disease ROS     Malignancy:       Other: Comment: insomnia              OUTSIDE LABS:  CBC: No results found for: WBC, HGB, HCT, PLT  BMP:   Lab Results   Component Value Date     (H) 2022     COAGS: No results found for: PTT, INR, FIBR  POC: No results found for: BGM, HCG, HCGS  HEPATIC: No results found for: ALBUMIN, PROTTOTAL, ALT, AST, GGT, ALKPHOS, BILITOTAL, BILIDIRECT, AYDEE  OTHER: No results found for: PH, LACT, A1C, LICO, PHOS, MAG, LIPASE, AMYLASE, TSH, T4, T3, CRP, SED    Anesthesia Plan    ASA Status:  2   NPO Status:  NPO Appropriate    Anesthesia Type: General.     - Airway: LMA   Induction: Intravenous.   Maintenance: Balanced.        Consents             Postoperative Care       PONV prophylaxis: Ondansetron (or other 5HT-3), Dexamethasone or Solumedrol     Comments:           H&P reviewed: Unable to attach VIRTUAL H&P to encounter due to EHR limitations. Appropriate H&P reviewed. The physical exam performed by anesthesia during this surgical encounter serves as the physical portion of that virtual H&P.  Any significant changes noted within this preop evaluation.          Anna Hahn MD

## 2022-06-03 NOTE — ANESTHESIA CARE TRANSFER NOTE
Patient: Maranda Arnett    Procedure: Procedure(s):  Excision of Right ankle tenosynovial giant cell tumor / pigmented villonodular synovitis       Diagnosis: Ankle mass, right [R22.41]  Diagnosis Additional Information: No value filed.    Anesthesia Type:   General     Note:    Oropharynx: oral airway in place  Level of Consciousness: drowsy  Oxygen Supplementation: face mask  Level of Supplemental Oxygen (L/min / FiO2): 8  Independent Airway: airway patency satisfactory and stable  Dentition: dentition unchanged    Report to RN Given: handoff report given  Patient transferred to: PACU    Handoff Report: Identifed the Patient, Identified the Reponsible Provider, Reviewed the pertinent medical history, Discussed the surgical course, Reviewed Intra-OP anesthesia mangement and issues during anesthesia, Set expectations for post-procedure period and Allowed opportunity for questions and acknowledgement of understanding      Vitals:  Vitals Value Taken Time   /77 06/03/22 1130   Temp     Pulse 77 06/03/22 1132   Resp 12 06/03/22 1132   SpO2 100 % 06/03/22 1132   Vitals shown include unvalidated device data.    Electronically Signed By: FIDELINA Delatorre CRNA  Ce 3, 2022  11:32 AM

## 2022-06-03 NOTE — OP NOTE
Procedure Date: 06/03/2022    SURGEON:  Gregory El MD    ASSISTANT:  Marcio Rdz DO    PREOPERATIVE DIAGNOSIS:  Tenosynovial giant cell tumor/pigmented villonodular synovitis, right ankle dorsum.    POSTOPERATIVE DIAGNOSIS:  Tenosynovial giant cell tumor/pigmented villonodular synovitis, right ankle dorsum.    PROCEDURE PERFORMED:  Excision of right ankle tenosynovial giant cell tumor, greater than 5 cm, deep.    INDICATIONS:  A 67-year-old female has undergone previous histologic diagnosis with biopsy of a soft tissue mass arising from the anterior capsule of her ankle joint extending to the medial malleolus.    DESCRIPTION OF PROCEDURE:  The patient was placed on the operating table after induction of general anesthesia.  Standard prep and drape of the right lower extremity accomplished.  Tourniquet was inflated after elevation of the limb.  A longitudinal incision over the anteromedial aspect of the ankle joint was then performed and taken down through the skin and subcutaneous tissue.  Care was taken to avoid injury to the dorsalis pedis pulse or the superficial branch of the peroneal nerve.  I then opened up the retinaculum over the dorsal aspect of the ankle.  The mass was encountered beneath the retinacular tissue and beneath the tendons including the EHL and the extensor digitorum longus tendons.  On the anteromedial aspect, the capsule was opened and the mass was encountered.  I then  the mass from the surrounding tissue structures.  Several blood vessels had to be cauterized or ligated.  The mass was found to be arising from the anterior aspect of the ankle joint within the talar neck and the tibiotalar joint.  The mass was then placed on tension and any attachment points to the capsular tissue were removed.  The mass had grown in an exophytic nature along the medial aspect of the ankle into the subcutaneous tissues and this mass was excised en bloc with the deeper mass alongside the anterior  capsule of the ankle joint.  All soft tissue attachments were now divided and the mass was completely delivered out of the wound.  There was no remaining anterior capsule of the tibiotalar joint.    Thorough irrigation was used to cleanse the area.  Visual inspection confirmed there was no evidence of any remaining neoplastic tissue.  There was no remaining evidence of a pigmented villonodular sinus within the tibiotalar joint either.    Wound closure was accomplished in layers by first reapproximating the retinacular tissues with 2-0 PDS sutures placed in far-near/near-far type fashion.  Once this was accomplished, the remainder of the wound was then closed with absorbable sutures.    POSTOPERATIVE PLAN:  The patient is to maintain weightbearing as tolerated status.  She will utilize a walking boot to immobilize the tibiotalar joint for 2 weeks' time postoperatively in order to facilitate wound healing.  She may remove the boot for range of motion exercises when she is not ambulating.    Gregory El MD        D: 2022   T: 2022   MT: MAGNUS    Name:     SHAYAN ROA  MRN:      [id number]        Account:        [id number]   :      1955           Procedure Date: 2022     Document: [id number]

## 2022-06-03 NOTE — DISCHARGE INSTRUCTIONS

## 2022-06-03 NOTE — BRIEF OP NOTE
Orthopaedic Brief Operative Note 6/3/2022 11:27 AM    Patient: Maranda Arnett  MRN: [id number]       Pre-operative diagnosis: Ankle mass, right [R22.41]   Post-operative diagnosis: Same   Procedure: Procedure(s):  Excision of Right ankle tenosynovial giant cell tumor / pigmented villonodular synovitis     Surgeon: Gregory El MD   Assistant(s): Marcio Rdz MD     Anesthesia: General   Estimated blood loss: Less than 10 ml   Total IV fluids: (See anesthesia record)   Total urine output: (See anesthesia record)   Blood transfusion No transfusion was given during surgery   Drains: neither None   Specimens: None   Implants: None  See dictated operative report for full details   Grafts: None   Findings: Left ankle mass consistent grossly with pigmented villonodular synovitis  See dictated operative report for full details   Complications: None   Disposition: Stable and extubated to PACU     Plan:    Pain: Scheduled Tylenol, IV Dilaudid PRN, oxycodone PRN  Activity: Weight bearing as tolerated. Wear walking boot for comfort x 2 weeks to allow incision to heal.  OK to remove boot for hygene and when sleeping..  Wound care: Remove dressing 2 weeks post op  Abx: Ancef/clindamycin periop  Diet: ADAT to regular  DVT ppx: Mechanical  Disposition: Discharge home pod day 0. Follow up in 2 weeks virtually with Dr. Nikko Rdz DO  Adult Reconstrution Fellow

## 2022-06-03 NOTE — PROGRESS NOTES
Report given to Denita Otoole over phone for transfer of care, pt. Transported on cart on room air with Rn to [facility] [address] Scott Sturges, RN on 6/3/2022 at 1:00 PM

## 2022-06-03 NOTE — ANESTHESIA POSTPROCEDURE EVALUATION
Patient: Maranda Arnett    Procedure: Procedure(s):  Excision of Right ankle tenosynovial giant cell tumor / pigmented villonodular synovitis       Anesthesia Type:  General    Note:  Disposition: Outpatient   Postop Pain Control: Uneventful            Sign Out: Well controlled pain   PONV: No   Neuro/Psych: Uneventful            Sign Out: Acceptable/Baseline neuro status   Airway/Respiratory: Uneventful            Sign Out: Acceptable/Baseline resp. status   CV/Hemodynamics: Uneventful            Sign Out: Acceptable CV status   Other NRE: NONE   DID A NON-ROUTINE EVENT OCCUR? No           Last vitals:  Vitals Value Taken Time   /76 06/03/22 1245   Temp 37  C (98.6  F) 06/03/22 1230   Pulse 55 06/03/22 1253   Resp 13 06/03/22 1254   SpO2 96 % 06/03/22 1255   Vitals shown include unvalidated device data.    Electronically Signed By: Anna Hahn MD  Ce 3, 2022  12:56 PM

## 2022-06-03 NOTE — ANESTHESIA PROCEDURE NOTES
Airway       Patient location during procedure: OR       Procedure Start/Stop Times: 6/3/2022 10:16 AM  Staff -        CRNA: Ana Yee APRN CRNA       Performed By: CRNA  Consent for Airway        Urgency: elective  Indications and Patient Condition       Indications for airway management: tiera-procedural       Induction type:intravenous       Mask difficulty assessment: 1 - vent by mask    Final Airway Details       Final airway type: supraglottic airway    Supraglottic Airway Details        Type: LMA       Brand: Ambu AuraGain       LMA size: 4    Post intubation assessment        Placement verified by: capnometry and equal breath sounds        Number of attempts at approach: 1       Number of other approaches attempted: 0       Secured with: pink tape       Ease of procedure: easy       Dentition: Intact    Medication(s) Administered   Medication Administration Time: 6/3/2022 10:16 AM

## 2022-06-06 PROCEDURE — 88307 TISSUE EXAM BY PATHOLOGIST: CPT | Mod: 26 | Performed by: PATHOLOGY

## 2022-06-16 ENCOUNTER — VIRTUAL VISIT (OUTPATIENT)
Dept: ORTHOPEDICS | Facility: CLINIC | Age: 67
End: 2022-06-16
Payer: COMMERCIAL

## 2022-06-16 DIAGNOSIS — M12.20 PVNS (PIGMENTED VILLONODULAR SYNOVITIS): Primary | ICD-10-CM

## 2022-06-16 PROCEDURE — 99024 POSTOP FOLLOW-UP VISIT: CPT | Mod: 95 | Performed by: ORTHOPAEDIC SURGERY

## 2022-06-16 NOTE — NURSING NOTE
No chief complaint on file.      67 year old  1955    There were no vitals taken for this visit.      Date/Surgery/Surgeon/Hospital:  Past Surgical History:   Procedure Laterality Date     [place] HAND/FINGER SURGERY UNLISTED  ??     [place] SHOULDER SURG PROC UNLISTED  ??     RESECT TUMOR LOWER EXTREMITY Right 6/3/2022    Procedure: Excision of Right ankle tenosynovial giant cell tumor / pigmented villonodular synovitis;  Surgeon: Gregory El MD;  Location: [place] [address].           [facility] [address]        No Known Allergies    Current Outpatient Medications   Medication     Cholecalciferol (VITAMIN D3) 1.25 MG (32085 UT) TABS     cyanocobalamin (VITAMIN B-12) 100 MCG tablet     famotidine (PEPCID) 20 MG tablet     ibuprofen (ADVIL/MOTRIN) 800 MG tablet     magnesium 250 MG tablet     ondansetron (ZOFRAN ODT) 4 MG ODT tab     oxyCODONE (ROXICODONE) 5 MG tablet     senna-docusate (SENOKOT-S/PERICOLACE) 8.6-50 MG tablet     traZODone (DESYREL) 50 MG tablet     No current facility-administered medications for this visit.             Questionnaires:    Promis 10 Assessment    PROMIS 10 5/5/2022   In general, would you say your health is: Fair   In general, would you say your quality of life is: Very good   In general, how would you rate your physical health? Fair   In general, how would you rate your mental health, including your mood and your ability to think? Very good   In general, how would you rate your satisfaction with your social activities and relationships? Very good   In general, please rate how well you carry out your usual social activities and roles Very good   To what extent are you able to carry out your everyday physical activities such as walking, climbing stairs, carrying groceries, or moving a chair? Completely   How often have you been bothered by emotional problems such as feeling anxious, depressed or irritable? Sometimes   How would you rate your fatigue on  average? Mild   How would you rate your pain on average?   0 = No Pain  to  10 = Worst Imaginable Pain 2   In general, would you say your health is: 2   In general, would you say your quality of life is: 4   In general, how would you rate your physical health? 2   In general, how would you rate your mental health, including your mood and your ability to think? 4   In general, how would you rate your satisfaction with your social activities and relationships? 4   In general, please rate how well you carry out your usual social activities and roles. (This includes activities at home, at work and in your community, and responsibilities as a parent, child, spouse, employee, friend, etc.) 4   To what extent are you able to carry out your everyday physical activities such as walking, climbing stairs, carrying groceries, or moving a chair? 5   In the past 7 days, how often have you been bothered by emotional problems such as feeling anxious, depressed, or irritable? 3   In the past 7 days, how would you rate your fatigue on average? 2   In the past 7 days, how would you rate your pain on average, where 0 means no pain, and 10 means worst imaginable pain? 2   Global Mental Health Score 15   Global Physical Health Score 15   PROMIS TOTAL - SUBSCORES 30

## 2022-06-16 NOTE — PROGRESS NOTES
[facility], Orthopaedic Oncology Surgery Consultation  by Gregory El M.D.    Maranda Arnett MRN# [id number]    YOB: 1955     Requesting physician: Epi Holland   No primary care provider on file.     DX:  1. 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      TREATMENTS:  1. Removal of uterine cancerous tumor at [facility] approx 10 years ago  2. 5/9/2022, Core needle biopsy R ankle soft tissue mass (Nikko) [facility]  3. 6/3/2022, excision of right ankle tenosynovial giant cell tumor.  (Nikko) [facility]    Post tumor excision check up.  Patient notes she is doing well but is concerned about the degree of swelling.  She denies any pain is not used any type of analgesic medication.  She is ambulating with use of a boot.    EXAM:  Some foot swelling.    Impression and plan:  1. Satisfactory recovery although she would benefit from physiotherapy.    2. Physical therapy prescription orders:  Gait training, full weightbearing on right foot.  Ace wrapping and massage for swelling and edema control.  Work on range of motion, dorsiflexion, plantarflexion.  May discontinue usage of the walking boot.  Also teach patient exercises for proprioceptive training.    3. Plan for MRI examination of the right ankle 6 months postoperatively with virtual visit to review MRI findings.  MRI can be done locally    Gregory El MD      Virtual-Visit Details    Type of service:  Video/telephone Visit  Video/telephone total duration (including visit time, pre and post visit work time as documented above on the same day of service): 20    Visit start time: 1715  Visit end time: 1735  Originating Location (pt. Location): Home  Distant Location (provider location):  [facility]   Platform used for Virtual Visit: [facility]     Gregory El MD  [facility] Family Professor  Oncology and Adult Reconstructive Surgery  Dept  Orthopaedic Surgery, [facility]  [phone] office, [phone] pager  [email or web address]

## 2022-06-16 NOTE — LETTER
6/16/2022         RE: Maranda Arnett  [address] [address]        Dear Colleague,    Thank you for referring your patient, Maranda Arnett, to the [facility] [place]. Please see a copy of my visit note below.        [facility], Orthopaedic Oncology Surgery Consultation  by Gregory El M.D.    Maranda Arnett MRN# [id number]    YOB: 1955     Requesting physician: Epi Holland   No primary care provider on file.     DX:  1. 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      TREATMENTS:  1. Removal of uterine cancerous tumor at [facility] approx 10 years ago  2. 5/9/2022, Core needle biopsy R ankle soft tissue mass (Nikko) [facility]  3. 6/3/2022, excision of right ankle tenosynovial giant cell tumor.  (Nikko) [facility]    Post tumor excision check up.  Patient notes she is doing well but is concerned about the degree of swelling.  She denies any pain is not used any type of analgesic medication.  She is ambulating with use of a boot.    EXAM:  Some foot swelling.    Impression and plan:  1. Satisfactory recovery although she would benefit from physiotherapy.    2. Physical therapy prescription orders:  Gait training, full weightbearing on right foot.  Ace wrapping and massage for swelling and edema control.  Work on range of motion, dorsiflexion, plantarflexion.  May discontinue usage of the walking boot.  Also teach patient exercises for proprioceptive training.    3. Plan for MRI examination of the right ankle 6 months postoperatively with virtual visit to review MRI findings.  MRI can be done locally    Gregory El MD      Virtual-Visit Details    Type of service:  Video/telephone Visit  Video/telephone total duration (including visit time, pre and post visit work time as documented above on the same day of service): 20    Visit start time: 1715  Visit end time:  1735  Originating Location (pt. Location): Home  Distant Location (provider location):  [facility] ORTHOPEDIC [facility]   Platform used for Virtual Visit: MD Earline Frost Family Professor  Oncology and Adult Reconstructive Surgery  Dept Orthopaedic Surgery, [facility]  [phone] office, [phone] pager  [email or web address]

## 2022-10-03 ENCOUNTER — HEALTH MAINTENANCE LETTER (OUTPATIENT)
Age: 67
End: 2022-10-03

## 2022-11-04 NOTE — LETTER
"    5/5/2022         RE: Maranda Arnett  [address] [facility] [address]        Dear Colleague,    Thank you for referring your patient, Maranda Arnett, to the [facility] [place]. Please see a copy of my visit note below.        [facility], Orthopaedic Oncology Surgery Consultation  by Gregory El M.D.    Maranda Arnett MRN# [id number]    YOB: 1955     Requesting physician: Epi Glover   No primary care provider on file.            Assessment and Plan:   Assessment:  Soft tissue mass 3 x 5.5 cm, superficial alongside dorsum of right tibiotalar joint.  Differential diagnosis would include benign eg PVNS, vs malignant soft tissue neoplasms.     Plan:  Proceed with core needle biopsy.  Unfortunate this could not be completed today and therefore will be planned for next clinic visit on Monday, May 9.           History of Present Illness:   67 year old female  chief complaint    This patient is referred by Dr. Holland in [place] for evaluation of a mass of her right ankle which has been present for approximately 3 to 4 years.  History is notable for a uterine malignancy 10 years ago.    Current symptoms:  Problem: Right Ankle Mass   Onset and duration: 4 years ago  Awakens from sleep due to sx's:  No  Precipitating Injury:  No    Other joints or sites painful:  No  Fever: No  Appetite change or weight loss: No  History of prior or existing cancer: Yes early stage uterine cancer     Background history:  DX:  1. Soft tissue mass right ankle    TREATMENTS:  1. Removal of uterine cancerous tumor at [facility] approx 10 years ago             Physical Exam:     EXAMINATION pertinent findings:   PSYCH: Pleasant, healthy-appearing, alert, oriented x3, cooperative. Normal mood and affect.  VITAL SIGNS: Height 1.651 m (5' 5\"), weight 83.9 kg (185 lb)..  Reviewed nursing intake notes.   Body mass index is 30.79 kg/m .  RESP: non labored breathing   ABD: " benign, soft, non-tender, no acute peritoneal findings  SKIN: grossly normal   LYMPHATIC: grossly normal, no adenopathy, no extremity edema  NEURO: grossly normal , no motor deficits  VASCULAR: satisfactory perfusion of all extremities   MUSCULOSKELETAL:   Gait is normal.  Palpable soft tissue mass of the dorsum of the right ankle.  Somewhat mobile.  3 cm in size.  Minimally tender.  Normal tibiotalar and subtalar and midfoot motion.             Data:   All laboratory data reviewed  All imaging studies reviewed by me          DATA for DOCUMENTATION:         Past Medical History:   There is no problem list on file for this patient.    No past medical history on file.    Also see scanned health assessment forms.       Past Surgical History:   No past surgical history on file.         Social History:     Social History     Socioeconomic History     Marital status:      Spouse name: Not on file     Number of children: Not on file     Years of education: Not on file     Highest education level: Not on file   Occupational History     Not on file   Tobacco Use     Smoking status: Not on file     Smokeless tobacco: Not on file   Substance and Sexual Activity     Alcohol use: Not on file     Drug use: Not on file     Sexual activity: Not on file   Other Topics Concern     Not on file   Social History Narrative     Not on file     Social Determinants of Health     Financial Resource Strain: Not on file   Food Insecurity: Not on file   Transportation Needs: Not on file   Physical Activity: Not on file   Stress: Not on file   Social Connections: Not on file   Intimate Partner Violence: Not on file   Housing Stability: Not on file            Family History:     No family history on file.         Medications:     Current Outpatient Medications   Medication Sig     Cholecalciferol (VITAMIN D3) 1.25 MG (20078 UT) TABS Vitamin D3     cyanocobalamin (VITAMIN B-12) 100 MCG tablet Vitamin B12   daily     famotidine (PEPCID) 20  MG tablet famotidine 20 mg tablet     traZODone (DESYREL) 50 MG tablet trazodone 50 mg tablet   TAKE 1 TABLET BY MOUTH EVERY DAY     No current facility-administered medications for this visit.              Review of Systems:   A comprehensive 10 point review of systems (constitutional, ENT, cardiac, peripheral vascular, lymphatic, respiratory, GI, , Musculoskeletal, skin, Neurological) was performed and found to be negative except as described in this note.     See intake form completed by patient    Answers for HPI/ROS submitted by the patient on 4/28/2022  General Symptoms: No  Skin Symptoms: No  HENT Symptoms: No  EYE SYMPTOMS: No  HEART SYMPTOMS: No  LUNG SYMPTOMS: No  INTESTINAL SYMPTOMS: No  URINARY SYMPTOMS: No  GYNECOLOGIC SYMPTOMS: No  BREAST SYMPTOMS: No  SKELETAL SYMPTOMS: Yes  BLOOD SYMPTOMS: No  NERVOUS SYSTEM SYMPTOMS: No  MENTAL HEALTH SYMPTOMS: No  Back pain: No  Muscle aches: No  Neck pain: No  Swollen joints: Yes  Joint pain: Yes  Bone pain: Yes  Muscle cramps: No  Muscle weakness: No  Joint stiffness: No  Bone fracture: No          Again, thank you for allowing me to participate in the care of your patient.        Sincerely,        Gregory El MD     Helical Rim Advancement Flap Text: The defect edges were debeveled with a #15 blade scalpel.  Given the location of the defect and the proximity to free margins (helical rim) a double helical rim advancement flap was deemed most appropriate.  Using a sterile surgical marker, the appropriate advancement flaps were drawn incorporating the defect and placing the expected incisions between the helical rim and antihelix where possible.  The area thus outlined was incised through and through with a #15 scalpel blade.  With a skin hook and iris scissors, the flaps were gently and sharply undermined and freed up.

## 2023-01-10 ENCOUNTER — TELEPHONE (OUTPATIENT)
Dept: ORTHOPEDICS | Facility: CLINIC | Age: 68
End: 2023-01-10

## 2023-01-10 DIAGNOSIS — R22.41 ANKLE MASS, RIGHT: Primary | ICD-10-CM

## 2023-01-10 NOTE — CONFIDENTIAL NOTE
- A call was placed to the patient.     - Patient said she would like her MRI order faxed to [facility] in [place] (Fax: [phone]).     -Told patient to call back and schedule a virtual visit with Dr. El after MRI is due to be complete.      - Patient verbalized understanding of plan and all questions were answered. Call back number to clinic was given and patient was told to call if they had an further questions.

## 2023-01-10 NOTE — TELEPHONE ENCOUNTER
[facility] Call Center    Phone Message    May a detailed message be left on voicemail: yes     Reason for Call: Other: Patient had surgery in June, patient was told to have MRI. Patient is wondering if she can have the MRI done in [place], where she lives. Please call her back to advise     Action Taken: Message routed to:  [facility] ([facility]): judy    Travel Screening: Not Applicable

## 2023-01-17 ENCOUNTER — TELEPHONE (OUTPATIENT)
Dept: ORTHOPEDICS | Facility: CLINIC | Age: 68
End: 2023-01-17
Payer: COMMERCIAL

## 2023-01-17 NOTE — TELEPHONE ENCOUNTER
[facility] Call Center    Phone Message    May a detailed message be left on voicemail: yes     Reason for Call: Order(s): Other:   Reason for requested: MRI  Date needed: asap  Provider name: Gregory El    Fax# provided was incorrect. Correct fax# is [phone].  Nurse also want to know if they are supposed to follow up with patient regarding the MRI results or send results to Dr. El?      Action Taken: Message routed to:  [facility] ([facility]): Orthopedics    Travel Screening: Not Applicable

## 2023-01-17 NOTE — TELEPHONE ENCOUNTER
Nurse at [facility] called and informed that they do not do MRIs there. [facility]C refaxed order to [facility] where pt previously had her MRI done.         -GERALDO Tucker- Orthopedics

## 2023-01-17 NOTE — TELEPHONE ENCOUNTER
I called the nurse at [facility] back to let them know that I faxed the physical therapy referral to the number they provided. I left a message relaying that information and answering their question about how to follow up with the results. I asked them to send us the images of the MRI once they are completed.     Shakeel Mccarty, EMT

## 2023-01-31 ENCOUNTER — TRANSFERRED RECORDS (OUTPATIENT)
Dept: HEALTH INFORMATION MANAGEMENT | Facility: CLINIC | Age: 68
End: 2023-01-31
Payer: COMMERCIAL

## 2023-03-16 ENCOUNTER — VIRTUAL VISIT (OUTPATIENT)
Dept: ORTHOPEDICS | Facility: CLINIC | Age: 68
End: 2023-03-16
Payer: COMMERCIAL

## 2023-03-16 DIAGNOSIS — M12.20 PVNS (PIGMENTED VILLONODULAR SYNOVITIS): Primary | ICD-10-CM

## 2023-03-16 PROCEDURE — 99213 OFFICE O/P EST LOW 20 MIN: CPT | Mod: GT | Performed by: ORTHOPAEDIC SURGERY

## 2023-03-16 NOTE — LETTER
3/16/2023         RE: Maranda Arnett  [address] [facility] [address]        Dear Colleague,    Thank you for referring your patient, Maranda Arnett, to the [facility] [place]. Please see a copy of my visit note below.        [facility], Orthopaedic Oncology Surgery Consultation  by Gregory El M.D.    Maranda Arnett MRN# [id number]    YOB: 1955     Requesting physician: Epi Holland   No primary care provider on file.     DX:  1. 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      TREATMENTS:  1. Removal of uterine cancerous tumor at [facility] approx 10 years ago  2. 5/9/2022, Core needle biopsy R ankle soft tissue mass ([place]) [facility]  3. 6/3/2022, excision of right ankle tenosynovial giant cell tumor.  ([place]) [facility]    Maranda is seen 9 months after undergoing excision of her tenosynovial giant cell tumor about the right ankle.  She is doing quite well and finds that she is having no pain or discomfort and is walking normally without limp.  She is not taking any type of analgesic medication.    Examination:  Gait is normal.  Incision is healed properly.  She has dorsiflexion of 10 degrees and plantarflexion of 45 degrees and 10 degrees of both inversion and eversion.  No swelling evident.    MRI examination performed at [facility] in [place] shows no evidence of recurrent tenosynovial giant cell tumor.  Some concern about signal within the anterior tibial tendon noted however patient is asymptomatic.    Impression:  Excellent recovery after resection of tenosynovial giant cell tumor with no evidence of recurrence.    Plan:  Patient would like to be released from regular scheduled follow-up surveillance as opposed to scheduling MRI examination at 2 years postoperatively.  I think this is reasonable.  She is doing quite well and essentially full function at this  point.  If she has any discomfort swelling or symptoms whatsoever she will be in contact with our office and at that time a follow-up MRI examination will be obtained.    Gregory El MD  [facility] Family Professor  Oncology and Adult Reconstructive Surgery  Dept Orthopaedic Surgery, [facility]  [phone] office, [phone] pager  [email or web address]    Virtual-Visit Details    Type of service:  Video Visit  Visit total duration (including visit time, pre and post visit work time as documented above on the same day of service): 20    Video start time: 1055  Video end time: 1115  Originating Location (pt. Location): Home  Distant Location (provider location):  [facility]   Platform used for Virtual Visit: [employer]

## 2023-03-16 NOTE — PROGRESS NOTES
[facility], Orthopaedic Oncology Surgery Consultation  by Gregory El M.D.    Maranda Arnett MRN# [id number]    YOB: 1955     Requesting physician: Epi Holland   No primary care provider on file.     DX:  1. 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      TREATMENTS:  1. Removal of uterine cancerous tumor at [facility] approx 10 years ago  2. 5/9/2022, Core needle biopsy R ankle soft tissue mass (Nik[place]) [facility]  3. 6/3/2022, excision of right ankle tenosynovial giant cell tumor.  ([facility]) [facility]    Maranda is seen 9 months after undergoing excision of her tenosynovial giant cell tumor about the right ankle.  She is doing quite well and finds that she is having no pain or discomfort and is walking normally without limp.  She is not taking any type of analgesic medication.    Examination:  Gait is normal.  Incision is healed properly.  She has dorsiflexion of 10 degrees and plantarflexion of 45 degrees and 10 degrees of both inversion and eversion.  No swelling evident.    MRI examination performed at [facility] in [place] shows no evidence of recurrent tenosynovial giant cell tumor.  Some concern about signal within the anterior tibial tendon noted however patient is asymptomatic.    Impression:  Excellent recovery after resection of tenosynovial giant cell tumor with no evidence of recurrence.    Plan:  Patient would like to be released from regular scheduled follow-up surveillance as opposed to scheduling MRI examination at 2 years postoperatively.  I think this is reasonable.  She is doing quite well and essentially full function at this point.  If she has any discomfort swelling or symptoms whatsoever she will be in contact with our office and at that time a follow-up MRI examination will be obtained.    Gregory El MD [facility]  Oncology and Adult Reconstructive Surgery  Dept  Orthopaedic Surgery, [facility]  [phone] office, [phone] pager  [email or web address]    Virtual-Visit Details    Type of service:  Video Visit  Visit total duration (including visit time, pre and post visit work time as documented above on the same day of service): 20    Video start time: 1055  Video end time: 1115  Originating Location (pt. Location): Home  Distant Location (provider location):  [facility] ORTHOPEDIC [facility]   Platform used for Virtual Visit: [employer]

## 2023-06-04 ENCOUNTER — HEALTH MAINTENANCE LETTER (OUTPATIENT)
Age: 68
End: 2023-06-04

## 2024-07-28 ENCOUNTER — HEALTH MAINTENANCE LETTER (OUTPATIENT)
Age: 69
End: 2024-07-28

## 2025-08-10 ENCOUNTER — HEALTH MAINTENANCE LETTER (OUTPATIENT)
Age: 70
End: 2025-08-10

## (undated) DEVICE — STRAP KNEE/BODY 31143004

## (undated) DEVICE — LINEN GOWN OVERSIZE 5408

## (undated) DEVICE — DRSG TEGADERM ALGINATE AG 4X5" 90303

## (undated) DEVICE — BNDG ELASTIC 6"X5YDS UNSTERILE 6611-60

## (undated) DEVICE — SPECIMEN CONTAINER 5OZ STERILE 2600SA

## (undated) DEVICE — PACK LOWER EXTREMITY RIVERSIDE SOP32LEFSX

## (undated) DEVICE — LINEN ORTHO PACK 5446

## (undated) DEVICE — GLOVE PROTEXIS W/NEU-THERA 7.0  2D73TE70

## (undated) DEVICE — PREP SKIN SCRUB TRAY 4461A

## (undated) DEVICE — ESU GROUND PAD UNIVERSAL W/O CORD

## (undated) DEVICE — DRAPE STOCKINETTE IMPERVIOUS 12" 1587

## (undated) DEVICE — GLOVE PROTEXIS BLUE W/NEU-THERA 8.0  2D73EB80

## (undated) DEVICE — PREP POVIDONE-IODINE 7.5% SCRUB 4OZ BOTTLE MDS093945

## (undated) DEVICE — SU PDS II 3-0 PS-1 18" Z683G

## (undated) DEVICE — SUCTION MANIFOLD NEPTUNE 2 SYS 4 PORT 0702-020-000

## (undated) DEVICE — DRSG TEGADERM 4X4 3/4" 1626W

## (undated) DEVICE — GOWN XLG DISP 9545

## (undated) DEVICE — DRAPE STOCKINETTE 8" 8586

## (undated) DEVICE — GLOVE PROTEXIS BLUE W/NEU-THERA 7.5  2D73EB75

## (undated) DEVICE — GLOVE PROTEXIS W/NEU-THERA 7.5  2D73TE75

## (undated) DEVICE — TOURNIQUET CUFF 30" REPRO BLUE 60-7070-105

## (undated) DEVICE — DRAPE IOBAN INCISE 23X17" 6650EZ

## (undated) DEVICE — Device

## (undated) DEVICE — PREP DURAPREP 26ML APL 8630

## (undated) DEVICE — SU PDS II 2-0 CT-2 27"  Z333H

## (undated) RX ORDER — DEXAMETHASONE SODIUM PHOSPHATE 4 MG/ML
INJECTION, SOLUTION INTRA-ARTICULAR; INTRALESIONAL; INTRAMUSCULAR; INTRAVENOUS; SOFT TISSUE
Status: DISPENSED
Start: 2022-06-03

## (undated) RX ORDER — LIDOCAINE HYDROCHLORIDE 10 MG/ML
INJECTION, SOLUTION INFILTRATION; PERINEURAL
Status: DISPENSED
Start: 2022-05-09

## (undated) RX ORDER — PROPOFOL 10 MG/ML
INJECTION, EMULSION INTRAVENOUS
Status: DISPENSED
Start: 2022-06-03

## (undated) RX ORDER — HYDROMORPHONE HCL IN WATER/PF 6 MG/30 ML
PATIENT CONTROLLED ANALGESIA SYRINGE INTRAVENOUS
Status: DISPENSED
Start: 2022-06-03

## (undated) RX ORDER — FENTANYL CITRATE 50 UG/ML
INJECTION, SOLUTION INTRAMUSCULAR; INTRAVENOUS
Status: DISPENSED
Start: 2022-06-03

## (undated) RX ORDER — ONDANSETRON 2 MG/ML
INJECTION INTRAMUSCULAR; INTRAVENOUS
Status: DISPENSED
Start: 2022-06-03

## (undated) RX ORDER — LIDOCAINE HYDROCHLORIDE 20 MG/ML
INJECTION, SOLUTION EPIDURAL; INFILTRATION; INTRACAUDAL; PERINEURAL
Status: DISPENSED
Start: 2022-06-03

## (undated) RX ORDER — OXYCODONE HYDROCHLORIDE 5 MG/1
TABLET ORAL
Status: DISPENSED
Start: 2022-06-03